# Patient Record
Sex: FEMALE | Race: WHITE
[De-identification: names, ages, dates, MRNs, and addresses within clinical notes are randomized per-mention and may not be internally consistent; named-entity substitution may affect disease eponyms.]

---

## 2020-11-23 ENCOUNTER — HOSPITAL ENCOUNTER (EMERGENCY)
Dept: HOSPITAL 33 - ED | Age: 35
Discharge: HOME | End: 2020-11-23
Payer: SELF-PAY

## 2020-11-23 VITALS — HEART RATE: 92 BPM | SYSTOLIC BLOOD PRESSURE: 110 MMHG | DIASTOLIC BLOOD PRESSURE: 68 MMHG

## 2020-11-23 VITALS — OXYGEN SATURATION: 100 %

## 2020-11-23 DIAGNOSIS — R51.9: ICD-10-CM

## 2020-11-23 DIAGNOSIS — F41.8: ICD-10-CM

## 2020-11-23 DIAGNOSIS — R55: Primary | ICD-10-CM

## 2020-11-23 DIAGNOSIS — Z79.899: ICD-10-CM

## 2020-11-23 LAB
ALBUMIN SERPL-MCNC: 4.2 G/DL (ref 3.5–5)
ALP SERPL-CCNC: 59 U/L (ref 38–126)
ALT SERPL-CCNC: 13 U/L (ref 0–35)
AMOURPHOUS CRYSTAL: (no result) /HPF
AMPHETAMINES UR QL: NEGATIVE
ANION GAP SERPL CALC-SCNC: 8.8 MEQ/L (ref 5–15)
AST SERPL QL: 17 U/L (ref 14–36)
BARBITURATES UR QL: NEGATIVE
BASOPHILS # BLD AUTO: 0.03 10*3/UL (ref 0–0.4)
BASOPHILS NFR BLD AUTO: 0.3 % (ref 0–0.4)
BENZODIAZ UR QL SCN: NEGATIVE
BILIRUB BLD-MCNC: 0.2 MG/DL (ref 0.2–1.3)
BUN SERPL-MCNC: 13 MG/DL (ref 7–17)
CALCIUM SPEC-MCNC: 9.5 MG/DL (ref 8.4–10.2)
CHLORIDE SERPL-SCNC: 105 MMOL/L (ref 98–107)
CO2 SERPL-SCNC: 31 MMOL/L (ref 22–30)
COCAINE UR QL SCN: NEGATIVE
CREAT SERPL-MCNC: 0.84 MG/DL (ref 0.52–1.04)
EOSINOPHIL # BLD AUTO: 0.17 10*3/UL (ref 0–0.5)
GFR SERPLBLD BASED ON 1.73 SQ M-ARVRAT: > 60 ML/MIN
GLUCOSE SERPL-MCNC: 90 MG/DL (ref 74–106)
GLUCOSE UR-MCNC: NEGATIVE MG/DL
HCT VFR BLD AUTO: 40.6 % (ref 35–47)
HGB BLD-MCNC: 13 GM/DL (ref 12–16)
LYMPHOCYTES # SPEC AUTO: 3.92 10*3/UL (ref 1–4.6)
MCH RBC QN AUTO: 31.7 PG (ref 26–32)
MCHC RBC AUTO-ENTMCNC: 32 G/DL (ref 32–36)
METHADONE UR QL: NEGATIVE
MONOCYTES # BLD AUTO: 0.76 10*3/UL (ref 0–1.3)
OPIATES UR QL: NEGATIVE
PCP UR QL CFM>20 NG/ML: NEGATIVE
PLATELET # BLD AUTO: 280 K/MM3 (ref 150–450)
POTASSIUM SERPLBLD-SCNC: 4.2 MMOL/L (ref 3.5–5.1)
PROT SERPL-MCNC: 7.1 G/DL (ref 6.3–8.2)
PROT UR STRIP-MCNC: NEGATIVE MG/DL
RBC # BLD AUTO: 4.1 M/MM3 (ref 4.1–5.4)
RBC #/AREA URNS HPF: (no result) /HPF (ref 0–2)
SODIUM SERPL-SCNC: 141 MMOL/L (ref 137–145)
THC UR QL SCN: NEGATIVE
WBC # BLD AUTO: 11.4 K/MM3 (ref 4–10.5)
WBC #/AREA URNS HPF: (no result) /HPF (ref 0–5)

## 2020-11-23 PROCEDURE — 81001 URINALYSIS AUTO W/SCOPE: CPT

## 2020-11-23 PROCEDURE — 80307 DRUG TEST PRSMV CHEM ANLYZR: CPT

## 2020-11-23 PROCEDURE — 85025 COMPLETE CBC W/AUTO DIFF WBC: CPT

## 2020-11-23 PROCEDURE — 70450 CT HEAD/BRAIN W/O DYE: CPT

## 2020-11-23 PROCEDURE — 36415 COLL VENOUS BLD VENIPUNCTURE: CPT

## 2020-11-23 PROCEDURE — 80053 COMPREHEN METABOLIC PANEL: CPT

## 2020-11-23 PROCEDURE — 93005 ELECTROCARDIOGRAM TRACING: CPT

## 2020-11-23 PROCEDURE — 84484 ASSAY OF TROPONIN QUANT: CPT

## 2020-11-23 PROCEDURE — 99284 EMERGENCY DEPT VISIT MOD MDM: CPT

## 2020-11-23 NOTE — XRAY
Indication: Headache and syncope.



Multiple contiguous axial images obtained through the head without contrast.



Comparison: None



Normal appearing brain parenchyma, ventricles, and bony calvarium.  Visualized

paranasal sinuses and mastoid air cells are clear.



Impression: Normal CT head without contrast exam.